# Patient Record
Sex: MALE | Race: WHITE | NOT HISPANIC OR LATINO | Employment: OTHER | URBAN - METROPOLITAN AREA
[De-identification: names, ages, dates, MRNs, and addresses within clinical notes are randomized per-mention and may not be internally consistent; named-entity substitution may affect disease eponyms.]

---

## 2023-09-07 DIAGNOSIS — I10 HYPERTENSION, UNSPECIFIED TYPE: Primary | ICD-10-CM

## 2023-09-08 PROBLEM — I10 HYPERTENSION: Status: ACTIVE | Noted: 2023-09-08

## 2023-09-08 PROBLEM — M79.89 LEG SWELLING: Status: ACTIVE | Noted: 2023-09-08

## 2023-09-08 PROBLEM — C91.10 CLL (CHRONIC LYMPHOCYTIC LEUKEMIA) (MULTI): Status: ACTIVE | Noted: 2023-09-08

## 2023-09-08 PROBLEM — B02.9 HERPES ZOSTER: Status: ACTIVE | Noted: 2023-09-08

## 2023-09-08 PROBLEM — M13.0 POLYARTHRITIS: Status: ACTIVE | Noted: 2023-09-08

## 2023-09-08 PROBLEM — G25.81 RESTLESS LEG SYNDROME: Status: ACTIVE | Noted: 2023-09-08

## 2023-09-08 PROBLEM — M17.9 OSTEOARTHROSIS OF KNEE: Status: ACTIVE | Noted: 2023-09-08

## 2023-09-08 PROBLEM — G62.9 PERIPHERAL NEUROPATHY: Status: ACTIVE | Noted: 2023-09-08

## 2023-09-08 PROBLEM — R79.89 LOW VITAMIN D LEVEL: Status: ACTIVE | Noted: 2023-09-08

## 2023-09-08 PROBLEM — N13.8 BPH WITH OBSTRUCTION/LOWER URINARY TRACT SYMPTOMS: Status: ACTIVE | Noted: 2023-09-08

## 2023-09-08 PROBLEM — N39.0 URINARY TRACT INFECTION: Status: ACTIVE | Noted: 2023-09-08

## 2023-09-08 PROBLEM — E78.00 HYPERCHOLESTEROLEMIA: Status: ACTIVE | Noted: 2023-09-08

## 2023-09-08 PROBLEM — N40.1 BPH WITH OBSTRUCTION/LOWER URINARY TRACT SYMPTOMS: Status: ACTIVE | Noted: 2023-09-08

## 2023-09-08 PROBLEM — M11.80 CALCIUM PYROPHOSPHATE ARTHROPATHY: Status: ACTIVE | Noted: 2023-09-08

## 2023-09-08 PROBLEM — I82.402: Status: ACTIVE | Noted: 2023-09-08

## 2023-09-08 RX ORDER — METOPROLOL TARTRATE 50 MG/1
50 TABLET ORAL DAILY
COMMUNITY
Start: 2012-04-26

## 2023-09-08 RX ORDER — POTASSIUM CHLORIDE 750 MG/1
10 TABLET, EXTENDED RELEASE ORAL
COMMUNITY
Start: 2023-05-17

## 2023-09-08 RX ORDER — FUROSEMIDE 20 MG/1
20 TABLET ORAL
COMMUNITY
Start: 2022-07-06

## 2023-09-08 RX ORDER — ACYCLOVIR 400 MG/1
400 TABLET ORAL EVERY 12 HOURS
COMMUNITY
Start: 2023-05-17

## 2023-09-08 RX ORDER — ACETAMINOPHEN 500 MG
1 TABLET ORAL DAILY
COMMUNITY
Start: 2013-04-20

## 2023-09-08 RX ORDER — AMLODIPINE BESYLATE 10 MG/1
10 TABLET ORAL
COMMUNITY
Start: 2023-03-02 | End: 2024-01-23 | Stop reason: ALTCHOICE

## 2023-09-08 RX ORDER — ALLOPURINOL 300 MG/1
300 TABLET ORAL
COMMUNITY
Start: 2022-06-22 | End: 2023-11-08 | Stop reason: ALTCHOICE

## 2023-09-08 RX ORDER — METOPROLOL TARTRATE 50 MG/1
50 TABLET ORAL DAILY
Qty: 90 TABLET | Refills: 3 | Status: SHIPPED | OUTPATIENT
Start: 2023-09-08 | End: 2024-05-08 | Stop reason: WASHOUT

## 2023-09-08 RX ORDER — SIMVASTATIN 20 MG/1
20 TABLET, FILM COATED ORAL DAILY
COMMUNITY
Start: 2012-04-28 | End: 2024-03-26

## 2023-09-14 DIAGNOSIS — L97.901 ULCER OF LOWER EXTREMITY, LIMITED TO BREAKDOWN OF SKIN, UNSPECIFIED LATERALITY (MULTI): ICD-10-CM

## 2023-09-14 DIAGNOSIS — G60.9 IDIOPATHIC PERIPHERAL NEUROPATHY: Primary | ICD-10-CM

## 2023-10-11 ENCOUNTER — OFFICE VISIT (OUTPATIENT)
Dept: WOUND CARE | Facility: CLINIC | Age: 83
End: 2023-10-11
Payer: MEDICARE

## 2023-10-11 DIAGNOSIS — L97.812 NON-PRESSURE CHRONIC ULCER OF OTHER PART OF RIGHT LOWER LEG WITH FAT LAYER EXPOSED (MULTI): Primary | ICD-10-CM

## 2023-10-11 DIAGNOSIS — M79.604 RIGHT LEG PAIN: Primary | ICD-10-CM

## 2023-10-11 PROCEDURE — 11104 PUNCH BX SKIN SINGLE LESION: CPT | Mod: PN

## 2023-10-11 PROCEDURE — 11042 DBRDMT SUBQ TIS 1ST 20SQCM/<: CPT | Mod: PN

## 2023-10-11 PROCEDURE — 99213 OFFICE O/P EST LOW 20 MIN: CPT | Mod: PN

## 2023-10-11 PROCEDURE — 0753T DGTZ GLS MCRSCP SLD LEVEL IV: CPT | Mod: TC,SUR | Performed by: STUDENT IN AN ORGANIZED HEALTH CARE EDUCATION/TRAINING PROGRAM

## 2023-10-11 PROCEDURE — 88305 TISSUE EXAM BY PATHOLOGIST: CPT | Performed by: PATHOLOGY

## 2023-10-12 ENCOUNTER — HOSPITAL ENCOUNTER (OUTPATIENT)
Dept: RADIOLOGY | Facility: HOSPITAL | Age: 83
Discharge: HOME | End: 2023-10-12
Payer: MEDICARE

## 2023-10-12 DIAGNOSIS — M79.604 RIGHT LEG PAIN: ICD-10-CM

## 2023-10-12 PROCEDURE — 73590 X-RAY EXAM OF LOWER LEG: CPT | Mod: RT,FY

## 2023-10-16 ENCOUNTER — TELEPHONE (OUTPATIENT)
Dept: PODIATRY | Facility: HOSPITAL | Age: 83
End: 2023-10-16
Payer: MEDICARE

## 2023-10-16 DIAGNOSIS — L97.812 ULCER OF RIGHT PRETIBIAL REGION, WITH FAT LAYER EXPOSED (MULTI): Primary | ICD-10-CM

## 2023-10-18 ENCOUNTER — OFFICE VISIT (OUTPATIENT)
Dept: WOUND CARE | Facility: CLINIC | Age: 83
End: 2023-10-18
Payer: MEDICARE

## 2023-10-18 DIAGNOSIS — L97.812 ULCER OF RIGHT PRETIBIAL REGION, WITH FAT LAYER EXPOSED (MULTI): Primary | ICD-10-CM

## 2023-10-18 DIAGNOSIS — L97.812 NON-PRESSURE CHRONIC ULCER OF OTHER PART OF RIGHT LOWER LEG WITH FAT LAYER EXPOSED (MULTI): Primary | ICD-10-CM

## 2023-10-18 DIAGNOSIS — L97.812 ULCER OF RIGHT PRETIBIAL REGION, WITH FAT LAYER EXPOSED (MULTI): ICD-10-CM

## 2023-10-18 DIAGNOSIS — G60.9 IDIOPATHIC PERIPHERAL NEUROPATHY: ICD-10-CM

## 2023-10-18 DIAGNOSIS — L97.812 NON-PRESSURE CHRONIC ULCER OF OTHER PART OF RIGHT LOWER LEG WITH FAT LAYER EXPOSED (MULTI): ICD-10-CM

## 2023-10-18 PROCEDURE — 11042 DBRDMT SUBQ TIS 1ST 20SQCM/<: CPT | Mod: PN

## 2023-10-23 NOTE — TELEPHONE ENCOUNTER
Patient's Surg path biopsy that was taken and sent on 10/11 could not be processed due to issues with the order. I was first notified on 10/16 by Tyrell Begum that it was improperly entered, attempted to fix it at the time. Was notified again on 10/18 that the order was not marked as collected, re-tried to place the order but was not able to have a solution despite the Epic help team. Lab was made aware and state that they cannot begin processing the order until issue is fixed. Ticket has been placed, no resolution to date.

## 2023-10-24 DIAGNOSIS — G60.3 IDIOPATHIC PROGRESSIVE NEUROPATHY: Primary | ICD-10-CM

## 2023-10-25 ENCOUNTER — OFFICE VISIT (OUTPATIENT)
Dept: WOUND CARE | Facility: CLINIC | Age: 83
End: 2023-10-25
Payer: MEDICARE

## 2023-10-25 PROCEDURE — 11042 DBRDMT SUBQ TIS 1ST 20SQCM/<: CPT | Mod: PN

## 2023-10-25 RX ORDER — GABAPENTIN 300 MG/1
600 CAPSULE ORAL 2 TIMES DAILY
Qty: 360 CAPSULE | Refills: 0 | Status: SHIPPED | OUTPATIENT
Start: 2023-10-25 | End: 2023-12-29

## 2023-11-01 ENCOUNTER — OFFICE VISIT (OUTPATIENT)
Dept: WOUND CARE | Facility: CLINIC | Age: 83
End: 2023-11-01
Payer: MEDICARE

## 2023-11-01 PROCEDURE — 11042 DBRDMT SUBQ TIS 1ST 20SQCM/<: CPT | Mod: PN

## 2023-11-06 LAB
LABORATORY COMMENT REPORT: NORMAL
PATH REPORT.FINAL DX SPEC: NORMAL
PATH REPORT.GROSS SPEC: NORMAL
PATH REPORT.RELEVANT HX SPEC: NORMAL
PATH REPORT.TOTAL CANCER: NORMAL

## 2023-11-06 PROCEDURE — 88312 SPECIAL STAINS GROUP 1: CPT | Performed by: PATHOLOGY

## 2023-11-06 PROCEDURE — 0756T DGTZ GLS MCRSCP SLD SPC GRPI: CPT | Mod: TC,SUR | Performed by: STUDENT IN AN ORGANIZED HEALTH CARE EDUCATION/TRAINING PROGRAM

## 2023-11-07 NOTE — PROGRESS NOTES
Subjective   Reason for Visit: Yusef Kim is an 83 y.o. male here for a Medicare Wellness visit.               HPI  The patient reports that he feels as though the right leg wound has become smaller.  He reports that the only time he experiences pain at the site is when he applies Santyl ointment or when the wound is debrided.  However, biopsy report from the region revealed squamous cell carcinoma of the skin and the patient has been referred to a dermatologist for further evaluation and treatment.    Over the past several years, the patient reports continued chronic dyspnea with moderate exertion- i.e. winding up a hose and carrying the hose inside-unchanged..  He reports no other associated symptoms.    Over the past few years he reports no urinary urgency.  Over the past several years, he still reports continued chronic nocturia x2-3, chronic frequency, chronic weak stream times years-unchanged.    Over the past several years,, with continued use of gabapentin, he does report a significant decrease in the intensity of nighttime episodes of burning pain extending from the knees to the feet.      No other new complaints.  Patient Care Team:  Yuriy Wayne MD as PCP - General     Review of Systems    Objective   Vitals:  There were no vitals taken for this visit.      Physical Exam  Head - palpation revealed no tenderness over the maxillary or frontal sinuses  Eyes - extraocular movements intact pupils equal and reactive to light; fundi revealed good retinal color no hemorrhages or exudates  Ears - palpation of pinnas and traguses revealed no tenderness. External auditory canals not erythematous or swollen, but are extremely narrow.  TMs not visualized secondary to narrow external auditory canals  Nose - turbinates not erythematous or swollen, nasal septal deviation noted to the left  Mouth - posterior pharynx is not erythematous or swollen. Tonsillar pillars appeared normal no exudates  Neck - no  lymphadenopathy. Thyroid gland not enlarged. , No bruits  Lungs - clear to auscultation bilaterally  Cardiac - rate normal rhythm regular no murmurs no JVD  Abdomen - soft ,obese normal active bowel sounds..  There is a 10 x 10 cm mass in the epigastrium.  Palpation revealed no tenderness or increase in warmth.  No tenderness or masses noted throughout the rest of the abdomen.   Liver percussed to 9 cm in total span  Pulses - 2+ bilaterally in the upper extremities; 0 bilaterally in the lower extremities  Extremities--2+ nonpitting ankle and pedal edema noted on the right. Palpation revealed no tenderness, increase in warmth or cords. Negative Homans sign noted. No edema noted left lower extremity.  Skin - varicose veins noted in the lower extremities bilaterally. Brownish yellow discoloration of the first toenails bilaterally.  Musculoskeletal  Right ankle--no erythema . Swelling is noted. Full range of motion in all directions of motion with no pain. Palpation revealed no tenderness or increase in warmth  Neurologic  Mental status-alert and oriented x3   Cranial nerves-2 through 12 grossly intact, no visual field abnormalities  Motor-no pronator drift noted, strength-5/5 in all muscle groups tested, , no tremor noted.  No bradykinesia noted.  No rigidity noted.  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  Chronic dyspnea on exertion-may be secondary to lack of conditioning, undertreated COPD,, diastolic dysfunction, aortic insufficiency  Chronic intermittent episodes of ringing in his ears bilaterally - may be secondary to impacted cerumen, sensorineural hearing loss  Recurrent nephrolithiasis  Chronic nocturia x 2, , chronic frequency, chronic weak stream? 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  SLL  Chronic intermittent nighttime episodes of burning pain extending from the knees to the feet-may be secondary to peripheral neuropathy,  Peripheral neuropathy    Plan  Obtain urinalysis, CBC differential, CMP, fasting lipid profile, TSH, vitamin D level, vitamin B12 level, cardiac CRP today.  Continue all current medications and supplements at this time  Patient should return for annual Medicare wellness visit in 1 year

## 2023-11-08 ENCOUNTER — LAB (OUTPATIENT)
Dept: LAB | Facility: LAB | Age: 83
End: 2023-11-08
Payer: MEDICARE

## 2023-11-08 ENCOUNTER — OFFICE VISIT (OUTPATIENT)
Dept: WOUND CARE | Facility: CLINIC | Age: 83
End: 2023-11-08
Payer: MEDICARE

## 2023-11-08 ENCOUNTER — OFFICE VISIT (OUTPATIENT)
Dept: PRIMARY CARE | Facility: CLINIC | Age: 83
End: 2023-11-08
Payer: MEDICARE

## 2023-11-08 VITALS
WEIGHT: 200.4 LBS | DIASTOLIC BLOOD PRESSURE: 60 MMHG | HEART RATE: 74 BPM | BODY MASS INDEX: 28.75 KG/M2 | SYSTOLIC BLOOD PRESSURE: 110 MMHG

## 2023-11-08 DIAGNOSIS — C91.10 CLL (CHRONIC LYMPHOCYTIC LEUKEMIA) (MULTI): ICD-10-CM

## 2023-11-08 DIAGNOSIS — N13.8 BPH WITH OBSTRUCTION/LOWER URINARY TRACT SYMPTOMS: Primary | ICD-10-CM

## 2023-11-08 DIAGNOSIS — E55.9 VITAMIN D DEFICIENCY: ICD-10-CM

## 2023-11-08 DIAGNOSIS — R79.89 LOW VITAMIN D LEVEL: ICD-10-CM

## 2023-11-08 DIAGNOSIS — I70.90 ARTERIOSCLEROSIS: ICD-10-CM

## 2023-11-08 DIAGNOSIS — C44.722 SQUAMOUS CELL CARCINOMA OF SKIN OF RIGHT LOWER EXTREMITY: Primary | ICD-10-CM

## 2023-11-08 DIAGNOSIS — E78.00 HYPERCHOLESTEROLEMIA: ICD-10-CM

## 2023-11-08 DIAGNOSIS — N13.8 BPH WITH OBSTRUCTION/LOWER URINARY TRACT SYMPTOMS: ICD-10-CM

## 2023-11-08 DIAGNOSIS — N40.1 BPH WITH OBSTRUCTION/LOWER URINARY TRACT SYMPTOMS: ICD-10-CM

## 2023-11-08 DIAGNOSIS — Z00.00 ROUTINE GENERAL MEDICAL EXAMINATION AT A HEALTH CARE FACILITY: ICD-10-CM

## 2023-11-08 DIAGNOSIS — N40.1 BPH WITH OBSTRUCTION/LOWER URINARY TRACT SYMPTOMS: Primary | ICD-10-CM

## 2023-11-08 LAB
25(OH)D3 SERPL-MCNC: 61 NG/ML (ref 30–100)
ALBUMIN SERPL BCP-MCNC: 4.4 G/DL (ref 3.4–5)
ALP SERPL-CCNC: 51 U/L (ref 33–136)
ALT SERPL W P-5'-P-CCNC: 24 U/L (ref 10–52)
ANION GAP SERPL CALC-SCNC: 14 MMOL/L (ref 10–20)
AST SERPL W P-5'-P-CCNC: 20 U/L (ref 9–39)
BASOPHILS # BLD AUTO: 0.03 X10*3/UL (ref 0–0.1)
BASOPHILS NFR BLD AUTO: 0.6 %
BILIRUB SERPL-MCNC: 0.7 MG/DL (ref 0–1.2)
BUN SERPL-MCNC: 18 MG/DL (ref 6–23)
CALCIUM SERPL-MCNC: 9.2 MG/DL (ref 8.6–10.6)
CHLORIDE SERPL-SCNC: 105 MMOL/L (ref 98–107)
CHOLEST SERPL-MCNC: 135 MG/DL (ref 0–199)
CHOLESTEROL/HDL RATIO: 3
CO2 SERPL-SCNC: 28 MMOL/L (ref 21–32)
CREAT SERPL-MCNC: 0.89 MG/DL (ref 0.5–1.3)
EOSINOPHIL # BLD AUTO: 0.03 X10*3/UL (ref 0–0.4)
EOSINOPHIL NFR BLD AUTO: 0.6 %
ERYTHROCYTE [DISTWIDTH] IN BLOOD BY AUTOMATED COUNT: 13.6 % (ref 11.5–14.5)
GFR SERPL CREATININE-BSD FRML MDRD: 84 ML/MIN/1.73M*2
GLUCOSE SERPL-MCNC: 88 MG/DL (ref 74–99)
HCT VFR BLD AUTO: 40.9 % (ref 41–52)
HDLC SERPL-MCNC: 45 MG/DL
HGB BLD-MCNC: 13.3 G/DL (ref 13.5–17.5)
IMM GRANULOCYTES # BLD AUTO: 0.03 X10*3/UL (ref 0–0.5)
IMM GRANULOCYTES NFR BLD AUTO: 0.6 % (ref 0–0.9)
LDLC SERPL CALC-MCNC: 71 MG/DL
LYMPHOCYTES # BLD AUTO: 1.48 X10*3/UL (ref 0.8–3)
LYMPHOCYTES NFR BLD AUTO: 31.1 %
MCH RBC QN AUTO: 29.9 PG (ref 26–34)
MCHC RBC AUTO-ENTMCNC: 32.5 G/DL (ref 32–36)
MCV RBC AUTO: 92 FL (ref 80–100)
MONOCYTES # BLD AUTO: 0.6 X10*3/UL (ref 0.05–0.8)
MONOCYTES NFR BLD AUTO: 12.6 %
NEUTROPHILS # BLD AUTO: 2.59 X10*3/UL (ref 1.6–5.5)
NEUTROPHILS NFR BLD AUTO: 54.5 %
NON HDL CHOLESTEROL: 90 MG/DL (ref 0–149)
NRBC BLD-RTO: 0 /100 WBCS (ref 0–0)
PLATELET # BLD AUTO: 150 X10*3/UL (ref 150–450)
POC APPEARANCE, URINE: CLEAR
POC BILIRUBIN, URINE: NEGATIVE
POC BLOOD, URINE: NEGATIVE
POC COLOR, URINE: YELLOW
POC GLUCOSE, URINE: NEGATIVE MG/DL
POC KETONES, URINE: NEGATIVE MG/DL
POC LEUKOCYTES, URINE: NEGATIVE
POC NITRITE,URINE: NEGATIVE
POC PH, URINE: 7.5 PH
POC PROTEIN, URINE: NEGATIVE MG/DL
POC SPECIFIC GRAVITY, URINE: 1.02
POC UROBILINOGEN, URINE: 1 EU/DL
POTASSIUM SERPL-SCNC: 4.4 MMOL/L (ref 3.5–5.3)
PROT SERPL-MCNC: 6.4 G/DL (ref 6.4–8.2)
RBC # BLD AUTO: 4.45 X10*6/UL (ref 4.5–5.9)
SODIUM SERPL-SCNC: 143 MMOL/L (ref 136–145)
TRIGL SERPL-MCNC: 97 MG/DL (ref 0–149)
TSH SERPL-ACNC: 1.35 MIU/L (ref 0.44–3.98)
VIT B12 SERPL-MCNC: 190 PG/ML (ref 211–911)
VLDL: 19 MG/DL (ref 0–40)
WBC # BLD AUTO: 4.8 X10*3/UL (ref 4.4–11.3)

## 2023-11-08 PROCEDURE — G0439 PPPS, SUBSEQ VISIT: HCPCS | Performed by: INTERNAL MEDICINE

## 2023-11-08 PROCEDURE — 80061 LIPID PANEL: CPT

## 2023-11-08 PROCEDURE — 82607 VITAMIN B-12: CPT

## 2023-11-08 PROCEDURE — 1160F RVW MEDS BY RX/DR IN RCRD: CPT | Performed by: INTERNAL MEDICINE

## 2023-11-08 PROCEDURE — 36415 COLL VENOUS BLD VENIPUNCTURE: CPT

## 2023-11-08 PROCEDURE — 11042 DBRDMT SUBQ TIS 1ST 20SQCM/<: CPT | Mod: PN

## 2023-11-08 PROCEDURE — 84443 ASSAY THYROID STIM HORMONE: CPT

## 2023-11-08 PROCEDURE — 81002 URINALYSIS NONAUTO W/O SCOPE: CPT | Performed by: INTERNAL MEDICINE

## 2023-11-08 PROCEDURE — 1159F MED LIST DOCD IN RCRD: CPT | Performed by: INTERNAL MEDICINE

## 2023-11-08 PROCEDURE — 85025 COMPLETE CBC W/AUTO DIFF WBC: CPT

## 2023-11-08 PROCEDURE — 3078F DIAST BP <80 MM HG: CPT | Performed by: INTERNAL MEDICINE

## 2023-11-08 PROCEDURE — 3074F SYST BP LT 130 MM HG: CPT | Performed by: INTERNAL MEDICINE

## 2023-11-08 PROCEDURE — 80053 COMPREHEN METABOLIC PANEL: CPT

## 2023-11-08 PROCEDURE — 1170F FXNL STATUS ASSESSED: CPT | Performed by: INTERNAL MEDICINE

## 2023-11-08 PROCEDURE — 82306 VITAMIN D 25 HYDROXY: CPT

## 2023-11-08 RX ORDER — NAPROXEN SODIUM 220 MG/1
81 TABLET, FILM COATED ORAL
COMMUNITY

## 2023-11-08 RX ORDER — COLLAGENASE SANTYL 250 [ARB'U]/G
OINTMENT TOPICAL DAILY
COMMUNITY
Start: 2023-10-16 | End: 2024-05-09 | Stop reason: WASHOUT

## 2023-11-08 ASSESSMENT — ENCOUNTER SYMPTOMS
OCCASIONAL FEELINGS OF UNSTEADINESS: 0
LOSS OF SENSATION IN FEET: 0
DEPRESSION: 0

## 2023-11-08 ASSESSMENT — ACTIVITIES OF DAILY LIVING (ADL)
DOING_HOUSEWORK: INDEPENDENT
TAKING_MEDICATION: INDEPENDENT
DRESSING: INDEPENDENT
BATHING: INDEPENDENT
MANAGING_FINANCES: INDEPENDENT
GROCERY_SHOPPING: INDEPENDENT

## 2023-11-09 DIAGNOSIS — E53.8 B12 DEFICIENCY: Primary | ICD-10-CM

## 2023-11-09 RX ORDER — LANOLIN ALCOHOL/MO/W.PET/CERES
1000 CREAM (GRAM) TOPICAL DAILY
Qty: 100 TABLET | Refills: 2 | Status: SHIPPED | OUTPATIENT
Start: 2023-11-09

## 2023-11-09 RX ORDER — LANOLIN ALCOHOL/MO/W.PET/CERES
1000 CREAM (GRAM) TOPICAL DAILY
COMMUNITY
End: 2023-11-09 | Stop reason: SDUPTHER

## 2023-11-09 NOTE — PROGRESS NOTES
Labs reviewed.  I have started the patient on vitamin B12 1000 mcg p.o. daily.  I told the patient that he should have a repeat vitamin B12 level in 3 months

## 2023-11-15 ENCOUNTER — OFFICE VISIT (OUTPATIENT)
Dept: WOUND CARE | Facility: CLINIC | Age: 83
End: 2023-11-15
Payer: MEDICARE

## 2023-11-15 PROCEDURE — 11042 DBRDMT SUBQ TIS 1ST 20SQCM/<: CPT | Mod: PN

## 2023-11-22 ENCOUNTER — APPOINTMENT (OUTPATIENT)
Dept: WOUND CARE | Facility: CLINIC | Age: 83
End: 2023-11-22
Payer: MEDICARE

## 2023-11-29 ENCOUNTER — APPOINTMENT (OUTPATIENT)
Dept: WOUND CARE | Facility: CLINIC | Age: 83
End: 2023-11-29
Payer: MEDICARE

## 2023-12-27 DIAGNOSIS — G60.3 IDIOPATHIC PROGRESSIVE NEUROPATHY: ICD-10-CM

## 2023-12-29 RX ORDER — GABAPENTIN 300 MG/1
600 CAPSULE ORAL 2 TIMES DAILY
Qty: 360 CAPSULE | Refills: 0 | Status: SHIPPED | OUTPATIENT
Start: 2023-12-29 | End: 2024-02-29

## 2024-01-10 DIAGNOSIS — I10 PRIMARY HYPERTENSION: Primary | ICD-10-CM

## 2024-01-10 RX ORDER — AMLODIPINE BESYLATE 5 MG/1
5 TABLET ORAL DAILY
Qty: 90 TABLET | Refills: 3 | Status: SHIPPED | OUTPATIENT
Start: 2024-01-10 | End: 2024-01-23 | Stop reason: ALTCHOICE

## 2024-01-23 DIAGNOSIS — I10 PRIMARY HYPERTENSION: ICD-10-CM

## 2024-01-23 RX ORDER — AMLODIPINE BESYLATE 5 MG/1
5 TABLET ORAL DAILY
Qty: 90 TABLET | Refills: 3 | Status: SHIPPED | OUTPATIENT
Start: 2024-01-23 | End: 2024-03-11 | Stop reason: SDUPTHER

## 2024-02-26 ENCOUNTER — APPOINTMENT (OUTPATIENT)
Dept: DERMATOLOGY | Facility: CLINIC | Age: 84
End: 2024-02-26
Payer: MEDICARE

## 2024-02-27 DIAGNOSIS — G60.3 IDIOPATHIC PROGRESSIVE NEUROPATHY: ICD-10-CM

## 2024-02-29 RX ORDER — GABAPENTIN 300 MG/1
600 CAPSULE ORAL 2 TIMES DAILY
Qty: 360 CAPSULE | Refills: 3 | Status: SHIPPED | OUTPATIENT
Start: 2024-02-29

## 2024-03-11 DIAGNOSIS — I10 PRIMARY HYPERTENSION: ICD-10-CM

## 2024-03-11 RX ORDER — AMLODIPINE BESYLATE 5 MG/1
5 TABLET ORAL DAILY
Qty: 90 TABLET | Refills: 3 | Status: SHIPPED | OUTPATIENT
Start: 2024-03-11 | End: 2024-05-09 | Stop reason: DRUGHIGH

## 2024-03-26 DIAGNOSIS — E78.00 HYPERCHOLESTEROLEMIA: Primary | ICD-10-CM

## 2024-03-26 RX ORDER — SIMVASTATIN 20 MG/1
20 TABLET, FILM COATED ORAL DAILY
Qty: 90 TABLET | Refills: 3 | Status: SHIPPED | OUTPATIENT
Start: 2024-03-26

## 2024-04-16 ENCOUNTER — APPOINTMENT (OUTPATIENT)
Dept: DERMATOLOGY | Facility: CLINIC | Age: 84
End: 2024-04-16
Payer: MEDICARE

## 2024-05-08 NOTE — PROGRESS NOTES
Subjective   Patient ID: Yusef Kim is a 83 y.o. male who presents for blood pressure check    HPI   The patient reports no new complaints.  He did undergo closure of an ulcer on the right lower extremity December 12, 2023.    Review of Systems    Objective   There were no vitals taken for this visit.    Physical Exam    Neck - no lymphadenopathy. Thyroid gland not enlarged. , No bruits  Lungs - clear to auscultation bilaterally  Cardiac - rate normal rhythm regular no murmurs no JVD  Abdomen - soft ,obese normal active bowel sounds..  There is a 10 x 10 cm mass in the epigastrium.  Palpation revealed no tenderness or increase in warmth.  No tenderness or masses noted throughout the rest of the abdomen.   Liver percussed to 9 cm in total span  Pulses - 2+ bilaterally in the upper extremities; 0 bilaterally in the lower extremities  Extremities--2+ nonpitting ankle and pedal edema noted on the right. Palpation revealed no tenderness, increase in warmth or cords.   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  Chronic dyspnea on exertion-may be secondary to lack of conditioning, undertreated COPD,, diastolic dysfunction, aortic insufficiency  Chronic intermittent episodes of ringing in his ears bilaterally - may be secondary to impacted cerumen, sensorineural hearing loss  Recurrent nephrolithiasis  Chronic nocturia x 2, ,  chronic frequency, chronic weak stream? 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  Right leg wound,-September 2023-status post repair December 12,2023  Squamous cell carcinoma right anterior lower leg October 11, 2023  Multiple basal cell carcinomas status post removal.  SLL  Chronic intermittent nighttime episodes of burning pain extending from the knees to the feet-may be secondary to peripheral neuropathy,  Peripheral neuropathy    Plan  Continue current medication regimen for now.  Patient should return for annual Medicare wellness visit on or after November 9, 2024

## 2024-05-09 ENCOUNTER — OFFICE VISIT (OUTPATIENT)
Dept: PRIMARY CARE | Facility: CLINIC | Age: 84
End: 2024-05-09
Payer: MEDICARE

## 2024-05-09 VITALS
HEART RATE: 66 BPM | WEIGHT: 201 LBS | DIASTOLIC BLOOD PRESSURE: 60 MMHG | BODY MASS INDEX: 28.84 KG/M2 | SYSTOLIC BLOOD PRESSURE: 114 MMHG

## 2024-05-09 DIAGNOSIS — G25.81 RESTLESS LEG SYNDROME: ICD-10-CM

## 2024-05-09 DIAGNOSIS — E55.9 VITAMIN D DEFICIENCY: ICD-10-CM

## 2024-05-09 DIAGNOSIS — N40.1 BPH WITH OBSTRUCTION/LOWER URINARY TRACT SYMPTOMS: ICD-10-CM

## 2024-05-09 DIAGNOSIS — C91.10 CLL (CHRONIC LYMPHOCYTIC LEUKEMIA) (MULTI): Primary | ICD-10-CM

## 2024-05-09 DIAGNOSIS — G62.89 OTHER POLYNEUROPATHY: ICD-10-CM

## 2024-05-09 DIAGNOSIS — N13.8 BPH WITH OBSTRUCTION/LOWER URINARY TRACT SYMPTOMS: ICD-10-CM

## 2024-05-09 DIAGNOSIS — I10 PRIMARY HYPERTENSION: ICD-10-CM

## 2024-05-09 DIAGNOSIS — E78.00 HYPERCHOLESTEROLEMIA: ICD-10-CM

## 2024-05-09 PROCEDURE — 1160F RVW MEDS BY RX/DR IN RCRD: CPT | Performed by: INTERNAL MEDICINE

## 2024-05-09 PROCEDURE — 3074F SYST BP LT 130 MM HG: CPT | Performed by: INTERNAL MEDICINE

## 2024-05-09 PROCEDURE — 3078F DIAST BP <80 MM HG: CPT | Performed by: INTERNAL MEDICINE

## 2024-05-09 PROCEDURE — 1159F MED LIST DOCD IN RCRD: CPT | Performed by: INTERNAL MEDICINE

## 2024-05-09 PROCEDURE — 99212 OFFICE O/P EST SF 10 MIN: CPT | Performed by: INTERNAL MEDICINE

## 2024-05-09 RX ORDER — AMLODIPINE BESYLATE 10 MG/1
10 TABLET ORAL DAILY
Qty: 90 TABLET | Refills: 3 | Status: SHIPPED | OUTPATIENT
Start: 2024-05-09 | End: 2024-05-09 | Stop reason: SDUPTHER

## 2024-05-09 RX ORDER — AMLODIPINE BESYLATE 10 MG/1
10 TABLET ORAL DAILY
Qty: 90 TABLET | Refills: 3 | Status: SHIPPED | OUTPATIENT
Start: 2024-05-09

## 2024-11-12 NOTE — PROGRESS NOTES
Subjective   Reason for Visit: Yusef Kim is an 84 y.o. male here for a Medicare Wellness visit.               HPI  Over the past year, the patient reports continued chronic dyspnea with more vigorous exertion-unchanged.    He reports no other new complaints.  He completed his course of chemotherapy in August of this year  Patient Care Team:  Yuriy Wayne MD as PCP - General     Review of Systems  All systems have been reviewed and are normal except as previously noted  Objective   Vitals:  There were no vitals taken for this visit.      Physical Exam  Head - palpation revealed no tenderness over the maxillary or frontal sinuses  Eyes - extraocular movements intact pupils equal and reactive to light; fundi not well-visualized  Ears - palpation of pinnas and traguses revealed no tenderness. External auditory canals not erythematous or swollen, but are extremely narrow.  TMs not visualized secondary to narrow external auditory canals  Nose - turbinates not erythematous or swollen, nasal septal deviation noted to the left  Mouth - posterior pharynx is not erythematous or swollen. Tonsillar pillars appeared normal no exudates  Neck - no lymphadenopathy. Thyroid gland not enlarged. , No bruits  Lungs - clear to auscultation bilaterally  Cardiac -heart sounds distant, rate normal rhythm regular no murmurs no JVD  Abdomen - soft , nondistended.  Normal active bowel sounds..  There is a 10 x 10 cm mass in the epigastrium.  Palpation revealed no tenderness or increase in warmth.  No tenderness or masses noted throughout the rest of the abdomen.   Liver percussed to 9 cm in total span  Pulses - 2+ bilaterally in the upper extremities; 0 bilaterally in the lower extremities  Extremities--2+ nonpitting ankle and pedal edema noted on the right. Palpation revealed no tenderness, increase in warmth or cords. Negative Homans sign noted. No edema noted left lower extremity.  Skin - varicose veins noted in the lower  extremities bilaterally. Brownish yellow discoloration of the first toenails bilaterally..  Light yellowish discoloration of the right second and right fifth toenails noted.  Musculoskeletal  Right ankle--no erythema . Swelling is noted. Full range of motion in all directions of motion with no pain. Palpation revealed no tenderness or increase in warmth  Neurologic  Mental status-alert and oriented x3   Cranial nerves-2 through 12 grossly intact, no visual field abnormalities  Motor-no pronator drift noted, strength-5/5 in all muscle groups tested, , no tremor noted.  No bradykinesia noted.  No rigidity noted.  Negative pull test  Sensory-Light touch sensation fully intact  Pinprick sensation fully intact  Vibratory sensation not present in the first toes bilaterally, markedly diminished in both ankles right greater than left, and in both knees bilaterally equally  Cerebellar-no truncal ataxia, good coordination finger-nose testing,, good coordination heel-to-shin testing, normal rapid alternating movements  Romberg negative; moderately decreased coordination in tandem gait  Reflexes-1+/4 bilaterally  Assessment & Plan  Routine general medical examination at a health care facility    Orders:    CBC and Auto Differential; Future    Comprehensive Metabolic Panel; Future    Lipid Panel; Future    Vitamin B12; Future    Vitamin D 25-Hydroxy,Total (for eval of Vitamin D levels); Future    Thyroid Stimulating Hormone; Future    Hypercholesterolemia    Orders:    CBC and Auto Differential; Future    Comprehensive Metabolic Panel; Future    Lipid Panel; Future    Vitamin B12; Future    Vitamin D 25-Hydroxy,Total (for eval of Vitamin D levels); Future    Thyroid Stimulating Hormone; Future    Other polyneuropathy    Orders:    CBC and Auto Differential; Future    Comprehensive Metabolic Panel; Future    Lipid Panel; Future    Vitamin B12; Future    Vitamin D 25-Hydroxy,Total (for eval of Vitamin D levels); Future    Thyroid  Stimulating Hormone; Future    CLL (chronic lymphocytic leukemia) (Multi)    Orders:    CBC and Auto Differential; Future    Comprehensive Metabolic Panel; Future    Lipid Panel; Future    Vitamin B12; Future    Vitamin D 25-Hydroxy,Total (for eval of Vitamin D levels); Future    Thyroid Stimulating Hormone; Future    Primary hypertension    Orders:    CBC and Auto Differential; Future    Comprehensive Metabolic Panel; Future    Lipid Panel; Future    Vitamin B12; Future    Vitamin D 25-Hydroxy,Total (for eval of Vitamin D levels); Future    Thyroid Stimulating Hormone; Future    Idiopathic progressive neuropathy    Orders:    gabapentin (Neurontin) 300 mg capsule; Take 1 capsule (300 mg) by mouth 3 times a day.    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  Chronic dyspnea on exertion-may be secondary to lack of conditioning, undertreated COPD,, diastolic dysfunction, aortic insufficiency  Chronic intermittent episodes of ringing in his ears bilaterally - may be secondary to impacted cerumen, sensorineural hearing loss  Recurrent nephrolithiasis  Elevated PSA-May be secondary to BPH, prostate cancer  Chronic nocturia x 2, , chronic frequency, chronic weak stream? Secondary to irritable bladder, BPH  Recurrent urinary tract infections  BPH  Erectile dysfunction  Chronic presence of a mass in the epigastrium-likely represents a ventral hernia  Acute herpes zoster August 2020 with postherpetic neuralgia  Vitamin D deficiency  Chronic intermittent episodes of burning/aching pain in the  right ankle-likely secondary to CPPD arthropathy.  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  Right leg wound,-September 2023-status post repair December 12,2023  Squamous cell carcinoma right anterior lower leg October 11, 2023  Multiple basal cell carcinomas status post removal.  Vitamin B12 deficiency  SLL  Chronic intermittent nighttime episodes of burning pain extending from the knees to the feet-may be secondary to peripheral neuropathy,  Peripheral neuropathy    Plan  Obtain urinalysis, CBC differential, CMP, fasting lipid profile, TSH, vitamin D level, vitamin B12 level today.  Continue current medication regimen for now-I did recommend that the patient take Tylenol 1000 mg p.o. every 6 hours as needed pain and I told that he could apply Voltaren gel to painful regions every 6 hours as needed  He will return for a annual Medicare wellness visit in 1 year

## 2024-11-12 NOTE — ASSESSMENT & PLAN NOTE
Orders:    CBC and Auto Differential; Future    Comprehensive Metabolic Panel; Future    Lipid Panel; Future    Vitamin B12; Future    Vitamin D 25-Hydroxy,Total (for eval of Vitamin D levels); Future    Thyroid Stimulating Hormone; Future

## 2024-11-14 ENCOUNTER — APPOINTMENT (OUTPATIENT)
Dept: PRIMARY CARE | Facility: CLINIC | Age: 84
End: 2024-11-14
Payer: MEDICARE

## 2024-11-14 ENCOUNTER — LAB (OUTPATIENT)
Dept: LAB | Facility: LAB | Age: 84
End: 2024-11-14
Payer: MEDICARE

## 2024-11-14 VITALS
SYSTOLIC BLOOD PRESSURE: 108 MMHG | DIASTOLIC BLOOD PRESSURE: 56 MMHG | BODY MASS INDEX: 28.98 KG/M2 | WEIGHT: 202.4 LBS | HEIGHT: 70 IN | HEART RATE: 66 BPM

## 2024-11-14 DIAGNOSIS — I10 PRIMARY HYPERTENSION: ICD-10-CM

## 2024-11-14 DIAGNOSIS — E78.00 HYPERCHOLESTEROLEMIA: ICD-10-CM

## 2024-11-14 DIAGNOSIS — C91.10 CLL (CHRONIC LYMPHOCYTIC LEUKEMIA) (MULTI): ICD-10-CM

## 2024-11-14 DIAGNOSIS — G60.3 IDIOPATHIC PROGRESSIVE NEUROPATHY: ICD-10-CM

## 2024-11-14 DIAGNOSIS — E55.9 VITAMIN D DEFICIENCY: ICD-10-CM

## 2024-11-14 DIAGNOSIS — Z00.00 ROUTINE GENERAL MEDICAL EXAMINATION AT A HEALTH CARE FACILITY: ICD-10-CM

## 2024-11-14 DIAGNOSIS — G62.89 OTHER POLYNEUROPATHY: ICD-10-CM

## 2024-11-14 DIAGNOSIS — Z00.00 ROUTINE GENERAL MEDICAL EXAMINATION AT A HEALTH CARE FACILITY: Primary | ICD-10-CM

## 2024-11-14 LAB
25(OH)D3 SERPL-MCNC: 68 NG/ML (ref 30–100)
ALBUMIN SERPL BCP-MCNC: 4.6 G/DL (ref 3.4–5)
ALP SERPL-CCNC: 58 U/L (ref 33–136)
ALT SERPL W P-5'-P-CCNC: 13 U/L (ref 10–52)
ANION GAP SERPL CALC-SCNC: 12 MMOL/L (ref 10–20)
AST SERPL W P-5'-P-CCNC: 14 U/L (ref 9–39)
BASOPHILS # BLD AUTO: 0.03 X10*3/UL (ref 0–0.1)
BASOPHILS NFR BLD AUTO: 0.6 %
BILIRUB SERPL-MCNC: 0.6 MG/DL (ref 0–1.2)
BUN SERPL-MCNC: 21 MG/DL (ref 6–23)
CALCIUM SERPL-MCNC: 9.4 MG/DL (ref 8.6–10.6)
CHLORIDE SERPL-SCNC: 104 MMOL/L (ref 98–107)
CHOLEST SERPL-MCNC: 135 MG/DL (ref 0–199)
CHOLESTEROL/HDL RATIO: 2.7
CO2 SERPL-SCNC: 29 MMOL/L (ref 21–32)
CREAT SERPL-MCNC: 0.92 MG/DL (ref 0.5–1.3)
EGFRCR SERPLBLD CKD-EPI 2021: 81 ML/MIN/1.73M*2
EOSINOPHIL # BLD AUTO: 0.11 X10*3/UL (ref 0–0.4)
EOSINOPHIL NFR BLD AUTO: 2.1 %
ERYTHROCYTE [DISTWIDTH] IN BLOOD BY AUTOMATED COUNT: 13 % (ref 11.5–14.5)
GLUCOSE SERPL-MCNC: 76 MG/DL (ref 74–99)
HCT VFR BLD AUTO: 45.6 % (ref 41–52)
HDLC SERPL-MCNC: 49.8 MG/DL
HGB BLD-MCNC: 14.7 G/DL (ref 13.5–17.5)
IMM GRANULOCYTES # BLD AUTO: 0.03 X10*3/UL (ref 0–0.5)
IMM GRANULOCYTES NFR BLD AUTO: 0.6 % (ref 0–0.9)
LDLC SERPL CALC-MCNC: 72 MG/DL
LYMPHOCYTES # BLD AUTO: 0.88 X10*3/UL (ref 0.8–3)
LYMPHOCYTES NFR BLD AUTO: 17 %
MCH RBC QN AUTO: 29.2 PG (ref 26–34)
MCHC RBC AUTO-ENTMCNC: 32.2 G/DL (ref 32–36)
MCV RBC AUTO: 91 FL (ref 80–100)
MONOCYTES # BLD AUTO: 0.68 X10*3/UL (ref 0.05–0.8)
MONOCYTES NFR BLD AUTO: 13.1 %
NEUTROPHILS # BLD AUTO: 3.46 X10*3/UL (ref 1.6–5.5)
NEUTROPHILS NFR BLD AUTO: 66.6 %
NON HDL CHOLESTEROL: 85 MG/DL (ref 0–149)
NRBC BLD-RTO: 0 /100 WBCS (ref 0–0)
PLATELET # BLD AUTO: 154 X10*3/UL (ref 150–450)
POC APPEARANCE, URINE: CLEAR
POC BILIRUBIN, URINE: NEGATIVE
POC BLOOD, URINE: NEGATIVE
POC COLOR, URINE: NORMAL
POC GLUCOSE, URINE: NEGATIVE MG/DL
POC KETONES, URINE: NEGATIVE MG/DL
POC LEUKOCYTES, URINE: NEGATIVE
POC NITRITE,URINE: NEGATIVE
POC PH, URINE: 5.5 PH
POC PROTEIN, URINE: NEGATIVE MG/DL
POC SPECIFIC GRAVITY, URINE: 1.02
POC UROBILINOGEN, URINE: 1 EU/DL
POTASSIUM SERPL-SCNC: 4.4 MMOL/L (ref 3.5–5.3)
PROT SERPL-MCNC: 6.5 G/DL (ref 6.4–8.2)
RBC # BLD AUTO: 5.04 X10*6/UL (ref 4.5–5.9)
SODIUM SERPL-SCNC: 141 MMOL/L (ref 136–145)
TRIGL SERPL-MCNC: 67 MG/DL (ref 0–149)
TSH SERPL-ACNC: 2.28 MIU/L (ref 0.44–3.98)
VIT B12 SERPL-MCNC: 987 PG/ML (ref 211–911)
VLDL: 13 MG/DL (ref 0–40)
WBC # BLD AUTO: 5.2 X10*3/UL (ref 4.4–11.3)

## 2024-11-14 PROCEDURE — G0439 PPPS, SUBSEQ VISIT: HCPCS | Performed by: INTERNAL MEDICINE

## 2024-11-14 PROCEDURE — 1036F TOBACCO NON-USER: CPT | Performed by: INTERNAL MEDICINE

## 2024-11-14 PROCEDURE — 80053 COMPREHEN METABOLIC PANEL: CPT

## 2024-11-14 PROCEDURE — 3078F DIAST BP <80 MM HG: CPT | Performed by: INTERNAL MEDICINE

## 2024-11-14 PROCEDURE — 82306 VITAMIN D 25 HYDROXY: CPT

## 2024-11-14 PROCEDURE — 82607 VITAMIN B-12: CPT

## 2024-11-14 PROCEDURE — 1160F RVW MEDS BY RX/DR IN RCRD: CPT | Performed by: INTERNAL MEDICINE

## 2024-11-14 PROCEDURE — 85025 COMPLETE CBC W/AUTO DIFF WBC: CPT

## 2024-11-14 PROCEDURE — 84443 ASSAY THYROID STIM HORMONE: CPT

## 2024-11-14 PROCEDURE — 1170F FXNL STATUS ASSESSED: CPT | Performed by: INTERNAL MEDICINE

## 2024-11-14 PROCEDURE — 81002 URINALYSIS NONAUTO W/O SCOPE: CPT | Performed by: INTERNAL MEDICINE

## 2024-11-14 PROCEDURE — 36415 COLL VENOUS BLD VENIPUNCTURE: CPT

## 2024-11-14 PROCEDURE — 1159F MED LIST DOCD IN RCRD: CPT | Performed by: INTERNAL MEDICINE

## 2024-11-14 PROCEDURE — 80061 LIPID PANEL: CPT

## 2024-11-14 PROCEDURE — 3074F SYST BP LT 130 MM HG: CPT | Performed by: INTERNAL MEDICINE

## 2024-11-14 RX ORDER — GABAPENTIN 300 MG/1
300 CAPSULE ORAL 3 TIMES DAILY
Qty: 360 CAPSULE | Refills: 3 | Status: SHIPPED | OUTPATIENT
Start: 2024-11-14

## 2024-11-14 ASSESSMENT — ENCOUNTER SYMPTOMS
OCCASIONAL FEELINGS OF UNSTEADINESS: 0
DEPRESSION: 0
LOSS OF SENSATION IN FEET: 0

## 2024-11-14 ASSESSMENT — ACTIVITIES OF DAILY LIVING (ADL)
MANAGING_FINANCES: INDEPENDENT
DOING_HOUSEWORK: INDEPENDENT
TAKING_MEDICATION: INDEPENDENT
DRESSING: INDEPENDENT
GROCERY_SHOPPING: INDEPENDENT
BATHING: INDEPENDENT

## 2024-11-14 NOTE — ASSESSMENT & PLAN NOTE
Orders:    gabapentin (Neurontin) 300 mg capsule; Take 1 capsule (300 mg) by mouth 3 times a day.    CBC and Auto Differential; Future    Comprehensive Metabolic Panel; Future    Lipid Panel; Future    Vitamin B12; Future    Vitamin D 25-Hydroxy,Total (for eval of Vitamin D levels); Future    Thyroid Stimulating Hormone; Future

## 2024-12-18 DIAGNOSIS — E78.00 HYPERCHOLESTEROLEMIA: ICD-10-CM

## 2024-12-19 RX ORDER — SIMVASTATIN 20 MG/1
20 TABLET, FILM COATED ORAL DAILY
Qty: 90 TABLET | Refills: 3 | Status: SHIPPED | OUTPATIENT
Start: 2024-12-19

## 2024-12-23 DIAGNOSIS — I10 PRIMARY HYPERTENSION: Primary | ICD-10-CM

## 2024-12-23 RX ORDER — METOPROLOL TARTRATE 50 MG/1
50 TABLET ORAL DAILY
Qty: 90 TABLET | Refills: 3 | Status: SHIPPED | OUTPATIENT
Start: 2024-12-23

## 2024-12-23 RX ORDER — POTASSIUM CHLORIDE 750 MG/1
10 TABLET, EXTENDED RELEASE ORAL DAILY
Qty: 90 TABLET | Refills: 3 | Status: SHIPPED | OUTPATIENT
Start: 2024-12-23

## 2025-01-17 DIAGNOSIS — G60.3 IDIOPATHIC PROGRESSIVE NEUROPATHY: ICD-10-CM

## 2025-01-20 RX ORDER — GABAPENTIN 300 MG/1
600 CAPSULE ORAL 2 TIMES DAILY
Qty: 360 CAPSULE | Refills: 3 | Status: SHIPPED | OUTPATIENT
Start: 2025-01-20 | End: 2025-01-23 | Stop reason: SDUPTHER

## 2025-01-23 ENCOUNTER — OFFICE VISIT (OUTPATIENT)
Dept: NEUROLOGY | Facility: CLINIC | Age: 85
End: 2025-01-23
Payer: MEDICARE

## 2025-01-23 VITALS
WEIGHT: 211 LBS | RESPIRATION RATE: 17 BRPM | BODY MASS INDEX: 30.21 KG/M2 | DIASTOLIC BLOOD PRESSURE: 70 MMHG | HEIGHT: 70 IN | HEART RATE: 61 BPM | SYSTOLIC BLOOD PRESSURE: 137 MMHG

## 2025-01-23 DIAGNOSIS — G60.3 IDIOPATHIC PROGRESSIVE NEUROPATHY: ICD-10-CM

## 2025-01-23 DIAGNOSIS — G60.9 IDIOPATHIC PERIPHERAL NEUROPATHY: Primary | ICD-10-CM

## 2025-01-23 PROCEDURE — 3075F SYST BP GE 130 - 139MM HG: CPT | Performed by: PSYCHIATRY & NEUROLOGY

## 2025-01-23 PROCEDURE — 1159F MED LIST DOCD IN RCRD: CPT | Performed by: PSYCHIATRY & NEUROLOGY

## 2025-01-23 PROCEDURE — 3078F DIAST BP <80 MM HG: CPT | Performed by: PSYCHIATRY & NEUROLOGY

## 2025-01-23 PROCEDURE — 99213 OFFICE O/P EST LOW 20 MIN: CPT | Performed by: PSYCHIATRY & NEUROLOGY

## 2025-01-23 PROCEDURE — 1126F AMNT PAIN NOTED NONE PRSNT: CPT | Performed by: PSYCHIATRY & NEUROLOGY

## 2025-01-23 RX ORDER — GABAPENTIN 300 MG/1
600 CAPSULE ORAL 2 TIMES DAILY
Qty: 360 CAPSULE | Refills: 3 | Status: SHIPPED | OUTPATIENT
Start: 2025-01-23

## 2025-01-23 ASSESSMENT — PAIN SCALES - GENERAL: PAINLEVEL_OUTOF10: 0-NO PAIN

## 2025-01-23 ASSESSMENT — PATIENT HEALTH QUESTIONNAIRE - PHQ9
SUM OF ALL RESPONSES TO PHQ9 QUESTIONS 1 AND 2: 0
1. LITTLE INTEREST OR PLEASURE IN DOING THINGS: NOT AT ALL
2. FEELING DOWN, DEPRESSED OR HOPELESS: NOT AT ALL

## 2025-01-23 ASSESSMENT — ENCOUNTER SYMPTOMS
DEPRESSION: 0
OCCASIONAL FEELINGS OF UNSTEADINESS: 0
LOSS OF SENSATION IN FEET: 0

## 2025-01-23 NOTE — PROGRESS NOTES
Subjective   Yusef Kim is a 84 y.o. left-handed male presenting for peripheral neuropathy follow up.     HPI  Patient currently takes 600 mg of gabapentin twice daily. He states medication manages symptoms enough to sleep. He experiences tingling, numbness, burning from knees down with excessive work (golf, working outside). Patient has not had an EMG. He had an MRI to evaluate lumbar spine. PMH significant for CLL and bilateral knee replacements. CLL is well managed.   Patient symptoms are helped by sleep and exercises advised by pain management.   Patient denies issues with balance.     Objective   Neurological Exam  Mental Status  Awake, alert and oriented to person, place and time. Speech is normal. Language is fluent with no aphasia.    Motor   Strength is 5/5 throughout all four extremities.  Normal dorsiflexion, thigh abduction and adduction. .    Gait  Normal casual, toe, heel and tandem gait.    Physical Exam  Constitutional:       Appearance: Normal appearance.   Neurological:      Motor: Motor strength is normal.  Psychiatric:         Speech: Speech normal.       I personally reviewed laboratory, radiographic, and medical studies which were pertinent for ***.    Assessment/Plan   Continue management with gabapentin. Look into gabapentin management through PCP.   Caution advised when weather warms up- keep up with conditioning during winter, ease into yard work and other activities.   {Assess/Plan SmartLinks (Optional):00315}

## 2025-04-03 DIAGNOSIS — I10 PRIMARY HYPERTENSION: ICD-10-CM

## 2025-04-03 RX ORDER — AMLODIPINE BESYLATE 10 MG/1
10 TABLET ORAL DAILY
Qty: 90 TABLET | Refills: 3 | Status: SHIPPED | OUTPATIENT
Start: 2025-04-03